# Patient Record
Sex: FEMALE | Race: OTHER | NOT HISPANIC OR LATINO
[De-identification: names, ages, dates, MRNs, and addresses within clinical notes are randomized per-mention and may not be internally consistent; named-entity substitution may affect disease eponyms.]

---

## 2018-08-26 PROBLEM — Z86.73 HISTORY OF CEREBROVASCULAR ACCIDENT: Status: RESOLVED | Noted: 2018-08-26 | Resolved: 2018-08-26

## 2018-08-26 PROBLEM — Z00.00 ENCOUNTER FOR PREVENTIVE HEALTH EXAMINATION: Status: ACTIVE | Noted: 2018-08-26

## 2018-08-26 PROBLEM — I10 HTN (HYPERTENSION): Status: ACTIVE | Noted: 2018-08-26

## 2018-08-26 PROBLEM — E78.5 HYPERLIPIDEMIA: Status: ACTIVE | Noted: 2018-08-26

## 2018-08-26 PROBLEM — Z82.3 FAMILY HISTORY OF CEREBROVASCULAR ACCIDENT (CVA): Status: ACTIVE | Noted: 2018-08-26

## 2018-08-26 PROBLEM — F41.9 ANXIETY: Status: ACTIVE | Noted: 2018-08-26

## 2018-08-26 PROBLEM — Z87.898 HISTORY OF SEIZURE: Status: RESOLVED | Noted: 2018-08-26 | Resolved: 2018-08-26

## 2018-08-28 ENCOUNTER — APPOINTMENT (OUTPATIENT)
Dept: HEMATOLOGY ONCOLOGY | Facility: CLINIC | Age: 63
End: 2018-08-28
Payer: MEDICAID

## 2018-08-28 VITALS
HEART RATE: 80 BPM | TEMPERATURE: 98.3 F | SYSTOLIC BLOOD PRESSURE: 121 MMHG | WEIGHT: 199 LBS | HEIGHT: 64 IN | DIASTOLIC BLOOD PRESSURE: 73 MMHG | BODY MASS INDEX: 33.97 KG/M2

## 2018-08-28 DIAGNOSIS — F41.9 ANXIETY DISORDER, UNSPECIFIED: ICD-10-CM

## 2018-08-28 DIAGNOSIS — I10 ESSENTIAL (PRIMARY) HYPERTENSION: ICD-10-CM

## 2018-08-28 DIAGNOSIS — Z82.3 FAMILY HISTORY OF STROKE: ICD-10-CM

## 2018-08-28 DIAGNOSIS — E78.5 HYPERLIPIDEMIA, UNSPECIFIED: ICD-10-CM

## 2018-08-28 DIAGNOSIS — Z86.73 PERSONAL HISTORY OF TRANSIENT ISCHEMIC ATTACK (TIA), AND CEREBRAL INFARCTION W/OUT RESIDUAL DEFICITS: ICD-10-CM

## 2018-08-28 DIAGNOSIS — Z63.4 DISAPPEARANCE AND DEATH OF FAMILY MEMBER: ICD-10-CM

## 2018-08-28 DIAGNOSIS — Z87.898 PERSONAL HISTORY OF OTHER SPECIFIED CONDITIONS: ICD-10-CM

## 2018-08-28 PROCEDURE — 99205 OFFICE O/P NEW HI 60 MIN: CPT

## 2018-08-28 RX ORDER — SERTRALINE HYDROCHLORIDE 50 MG/1
50 TABLET, FILM COATED ORAL
Refills: 0 | Status: ACTIVE | COMMUNITY

## 2018-08-28 SDOH — SOCIAL STABILITY - SOCIAL INSECURITY: DISSAPEARANCE AND DEATH OF FAMILY MEMBER: Z63.4

## 2018-08-29 PROBLEM — Z63.4 WIDOWED: Status: ACTIVE | Noted: 2018-08-29

## 2018-09-01 ENCOUNTER — FORM ENCOUNTER (OUTPATIENT)
Age: 63
End: 2018-09-01

## 2018-09-02 ENCOUNTER — OUTPATIENT (OUTPATIENT)
Dept: OUTPATIENT SERVICES | Facility: HOSPITAL | Age: 63
LOS: 1 days | End: 2018-09-02
Payer: MEDICAID

## 2018-09-02 ENCOUNTER — APPOINTMENT (OUTPATIENT)
Dept: NUCLEAR MEDICINE | Facility: CLINIC | Age: 63
End: 2018-09-02
Payer: MEDICAID

## 2018-09-02 DIAGNOSIS — C34.90 MALIGNANT NEOPLASM OF UNSPECIFIED PART OF UNSPECIFIED BRONCHUS OR LUNG: ICD-10-CM

## 2018-09-02 PROCEDURE — 78815 PET IMAGE W/CT SKULL-THIGH: CPT

## 2018-09-02 PROCEDURE — 78815 PET IMAGE W/CT SKULL-THIGH: CPT | Mod: 26,PI

## 2018-09-02 PROCEDURE — A9552: CPT

## 2018-09-04 ENCOUNTER — APPOINTMENT (OUTPATIENT)
Dept: NEUROSURGERY | Facility: CLINIC | Age: 63
End: 2018-09-04
Payer: MEDICAID

## 2018-09-04 ENCOUNTER — RESULT REVIEW (OUTPATIENT)
Age: 63
End: 2018-09-04

## 2018-09-04 ENCOUNTER — OUTPATIENT (OUTPATIENT)
Dept: OUTPATIENT SERVICES | Facility: HOSPITAL | Age: 63
LOS: 1 days | Discharge: HOME | End: 2018-09-04
Payer: MEDICAID

## 2018-09-04 VITALS
DIASTOLIC BLOOD PRESSURE: 82 MMHG | TEMPERATURE: 98.7 F | SYSTOLIC BLOOD PRESSURE: 126 MMHG | HEIGHT: 64 IN | BODY MASS INDEX: 33.97 KG/M2 | WEIGHT: 199 LBS | HEART RATE: 85 BPM | RESPIRATION RATE: 16 BRPM

## 2018-09-04 DIAGNOSIS — C79.31 SECONDARY MALIGNANT NEOPLASM OF BRAIN: ICD-10-CM

## 2018-09-04 DIAGNOSIS — C34.90 MALIGNANT NEOPLASM OF UNSPECIFIED PART OF UNSPECIFIED BRONCHUS OR LUNG: ICD-10-CM

## 2018-09-04 PROCEDURE — 99203 OFFICE O/P NEW LOW 30 MIN: CPT

## 2018-09-04 PROCEDURE — 88321 CONSLTJ&REPRT SLD PREP ELSWR: CPT

## 2018-09-05 LAB — SURGICAL PATHOLOGY FINAL REPORT - CH: SIGNIFICANT CHANGE UP

## 2018-09-06 ENCOUNTER — FORM ENCOUNTER (OUTPATIENT)
Age: 63
End: 2018-09-06

## 2018-09-07 ENCOUNTER — APPOINTMENT (OUTPATIENT)
Dept: ULTRASOUND IMAGING | Facility: CLINIC | Age: 63
End: 2018-09-07
Payer: MEDICAID

## 2018-09-07 ENCOUNTER — OUTPATIENT (OUTPATIENT)
Dept: OUTPATIENT SERVICES | Facility: HOSPITAL | Age: 63
LOS: 1 days | End: 2018-09-07
Payer: MEDICAID

## 2018-09-07 DIAGNOSIS — Z91.89 OTHER SPECIFIED PERSONAL RISK FACTORS, NOT ELSEWHERE CLASSIFIED: ICD-10-CM

## 2018-09-07 DIAGNOSIS — Z00.8 ENCOUNTER FOR OTHER GENERAL EXAMINATION: ICD-10-CM

## 2018-09-07 PROCEDURE — 93971 EXTREMITY STUDY: CPT

## 2018-09-07 PROCEDURE — 93971 EXTREMITY STUDY: CPT | Mod: 26,LT

## 2018-09-11 ENCOUNTER — FORM ENCOUNTER (OUTPATIENT)
Age: 63
End: 2018-09-11

## 2018-09-11 DIAGNOSIS — C34.90 MALIGNANT NEOPLASM OF UNSPECIFIED PART OF UNSPECIFIED BRONCHUS OR LUNG: ICD-10-CM

## 2018-09-11 DIAGNOSIS — C79.31 SECONDARY MALIGNANT NEOPLASM OF BRAIN: ICD-10-CM

## 2018-09-12 ENCOUNTER — OUTPATIENT (OUTPATIENT)
Dept: OUTPATIENT SERVICES | Facility: HOSPITAL | Age: 63
LOS: 1 days | End: 2018-09-12
Payer: MEDICAID

## 2018-09-12 ENCOUNTER — APPOINTMENT (OUTPATIENT)
Dept: ULTRASOUND IMAGING | Facility: CLINIC | Age: 63
End: 2018-09-12
Payer: MEDICAID

## 2018-09-12 ENCOUNTER — APPOINTMENT (OUTPATIENT)
Dept: HEMATOLOGY ONCOLOGY | Facility: CLINIC | Age: 63
End: 2018-09-12
Payer: MEDICAID

## 2018-09-12 ENCOUNTER — APPOINTMENT (OUTPATIENT)
Dept: NEUROSURGERY | Facility: CLINIC | Age: 63
End: 2018-09-12
Payer: MEDICAID

## 2018-09-12 VITALS
DIASTOLIC BLOOD PRESSURE: 80 MMHG | SYSTOLIC BLOOD PRESSURE: 134 MMHG | HEART RATE: 79 BPM | HEIGHT: 64 IN | TEMPERATURE: 98.7 F

## 2018-09-12 VITALS
SYSTOLIC BLOOD PRESSURE: 132 MMHG | HEART RATE: 85 BPM | WEIGHT: 199 LBS | TEMPERATURE: 98.3 F | DIASTOLIC BLOOD PRESSURE: 81 MMHG | HEIGHT: 64 IN | BODY MASS INDEX: 33.97 KG/M2 | RESPIRATION RATE: 17 BRPM

## 2018-09-12 DIAGNOSIS — Z98.890 OTHER SPECIFIED POSTPROCEDURAL STATES: ICD-10-CM

## 2018-09-12 DIAGNOSIS — Z00.8 ENCOUNTER FOR OTHER GENERAL EXAMINATION: ICD-10-CM

## 2018-09-12 PROCEDURE — 99215 OFFICE O/P EST HI 40 MIN: CPT | Mod: 25

## 2018-09-12 PROCEDURE — 36415 COLL VENOUS BLD VENIPUNCTURE: CPT

## 2018-09-12 PROCEDURE — 99214 OFFICE O/P EST MOD 30 MIN: CPT | Mod: 57

## 2018-09-12 PROCEDURE — 93970 EXTREMITY STUDY: CPT | Mod: 26

## 2018-09-12 PROCEDURE — 93970 EXTREMITY STUDY: CPT

## 2018-09-12 RX ORDER — DEXAMETHASONE 2 MG/1
2 TABLET ORAL
Refills: 0 | Status: DISCONTINUED | COMMUNITY
End: 2018-09-12

## 2018-09-12 RX ORDER — ISOSORBIDE MONONITRATE 30 MG
30 TABLET, EXTENDED RELEASE 24 HR ORAL
Refills: 0 | Status: DISCONTINUED | COMMUNITY
End: 2018-09-12

## 2018-09-12 RX ORDER — BISOPROLOL FUMARATE 5 MG/1
5 TABLET, FILM COATED ORAL
Refills: 0 | Status: DISCONTINUED | COMMUNITY
End: 2018-09-12

## 2018-09-12 RX ORDER — TORSEMIDE 20 MG/1
20 TABLET ORAL
Refills: 0 | Status: DISCONTINUED | COMMUNITY
End: 2018-09-12

## 2018-09-13 ENCOUNTER — LABORATORY RESULT (OUTPATIENT)
Age: 63
End: 2018-09-13

## 2018-09-14 ENCOUNTER — MEDICATION RENEWAL (OUTPATIENT)
Age: 63
End: 2018-09-14

## 2018-09-14 RX ORDER — LEVETIRACETAM 1000 MG/1
1000 TABLET, FILM COATED ORAL TWICE DAILY
Qty: 60 | Refills: 5 | Status: ACTIVE | COMMUNITY
Start: 2018-09-14 | End: 1900-01-01

## 2018-09-17 LAB
ALBUMIN SERPL ELPH-MCNC: 3.9 G/DL
ALP BLD-CCNC: 56 U/L
ALT SERPL-CCNC: 17 U/L
ANION GAP SERPL CALC-SCNC: 17 MMOL/L
AST SERPL-CCNC: 14 U/L
BILIRUB SERPL-MCNC: 0.5 MG/DL
BUN SERPL-MCNC: 23 MG/DL
CALCIUM SERPL-MCNC: 9.4 MG/DL
CHLORIDE SERPL-SCNC: 97 MMOL/L
CO2 SERPL-SCNC: 25 MMOL/L
CREAT SERPL-MCNC: 0.57 MG/DL
GLUCOSE SERPL-MCNC: 237 MG/DL
HCT VFR BLD CALC: 38.5 %
HGB BLD-MCNC: 13.1 G/DL
MCHC RBC-ENTMCNC: 28.2 PG
MCHC RBC-ENTMCNC: 33.9 GM/DL
MCV RBC AUTO: 83.2 FL
PLATELET # BLD AUTO: 82 K/UL
POTASSIUM SERPL-SCNC: 4 MMOL/L
PROT SERPL-MCNC: 6.4 G/DL
RBC # BLD: 4.63 M/UL
RBC # FLD: 12.7 %
SODIUM SERPL-SCNC: 139 MMOL/L
WBC # FLD AUTO: 7.7 K/UL

## 2018-09-18 PROBLEM — Z98.890 S/P CRANIOTOMY: Status: ACTIVE | Noted: 2018-09-04

## 2018-09-20 ENCOUNTER — FORM ENCOUNTER (OUTPATIENT)
Age: 63
End: 2018-09-20

## 2018-09-21 ENCOUNTER — APPOINTMENT (OUTPATIENT)
Dept: INFUSION THERAPY | Facility: CLINIC | Age: 63
End: 2018-09-21

## 2018-09-21 ENCOUNTER — APPOINTMENT (OUTPATIENT)
Dept: MRI IMAGING | Facility: CLINIC | Age: 63
End: 2018-09-21
Payer: MEDICAID

## 2018-09-21 ENCOUNTER — LABORATORY RESULT (OUTPATIENT)
Age: 63
End: 2018-09-21

## 2018-09-21 ENCOUNTER — OUTPATIENT (OUTPATIENT)
Dept: OUTPATIENT SERVICES | Facility: HOSPITAL | Age: 63
LOS: 1 days | End: 2018-09-21
Payer: MEDICAID

## 2018-09-21 ENCOUNTER — APPOINTMENT (OUTPATIENT)
Dept: HEMATOLOGY ONCOLOGY | Facility: CLINIC | Age: 63
End: 2018-09-21
Payer: MEDICAID

## 2018-09-21 VITALS
HEART RATE: 105 BPM | HEIGHT: 64 IN | BODY MASS INDEX: 33.87 KG/M2 | WEIGHT: 198.38 LBS | SYSTOLIC BLOOD PRESSURE: 125 MMHG | DIASTOLIC BLOOD PRESSURE: 81 MMHG | TEMPERATURE: 97.9 F

## 2018-09-21 DIAGNOSIS — Z00.8 ENCOUNTER FOR OTHER GENERAL EXAMINATION: ICD-10-CM

## 2018-09-21 LAB
HCT VFR BLD CALC: 31.3 %
HGB BLD-MCNC: 10.6 G/DL
MCHC RBC-ENTMCNC: 28.2 PG
MCHC RBC-ENTMCNC: 33.8 GM/DL
MCV RBC AUTO: 83.5 FL
PLATELET # BLD AUTO: 87 K/UL
RBC # BLD: 3.74 M/UL
RBC # FLD: 13.2 %
WBC # FLD AUTO: 6.8 K/UL

## 2018-09-21 PROCEDURE — 96372 THER/PROPH/DIAG INJ SC/IM: CPT

## 2018-09-21 PROCEDURE — 36415 COLL VENOUS BLD VENIPUNCTURE: CPT

## 2018-09-21 PROCEDURE — 99215 OFFICE O/P EST HI 40 MIN: CPT | Mod: 25

## 2018-09-21 PROCEDURE — 70553 MRI BRAIN STEM W/O & W/DYE: CPT | Mod: 26

## 2018-09-21 PROCEDURE — 85025 COMPLETE CBC W/AUTO DIFF WBC: CPT

## 2018-09-21 PROCEDURE — A9585: CPT

## 2018-09-21 PROCEDURE — 70553 MRI BRAIN STEM W/O & W/DYE: CPT

## 2018-09-21 RX ORDER — NYSTATIN 100000 [USP'U]/ML
100000 SUSPENSION ORAL 4 TIMES DAILY
Qty: 480 | Refills: 2 | Status: ACTIVE | COMMUNITY
Start: 2018-09-21 | End: 1900-01-01

## 2018-09-24 PROBLEM — Z87.891 FORMER SMOKER: Status: ACTIVE | Noted: 2018-09-24

## 2018-09-24 LAB
ANION GAP SERPL CALC-SCNC: 17 MMOL/L
BUN SERPL-MCNC: 20 MG/DL
CALCIUM SERPL-MCNC: 9 MG/DL
CHLORIDE SERPL-SCNC: 98 MMOL/L
CO2 SERPL-SCNC: 25 MMOL/L
CREAT SERPL-MCNC: 0.43 MG/DL
GLUCOSE SERPL-MCNC: 465 MG/DL
POTASSIUM SERPL-SCNC: 3.4 MMOL/L
SODIUM SERPL-SCNC: 140 MMOL/L

## 2018-09-26 PROBLEM — G72.0 STEROID MYOPATHY: Status: ACTIVE | Noted: 2018-09-26

## 2018-09-26 PROBLEM — E09.9 STEROID-INDUCED DIABETES: Status: ACTIVE | Noted: 2018-09-26

## 2018-09-26 PROBLEM — M79.669 CALF TENDERNESS: Status: RESOLVED | Noted: 2018-09-04 | Resolved: 2018-09-26

## 2018-09-26 RX ORDER — LEVETIRACETAM 100 MG/ML
100 SOLUTION ORAL
Refills: 0 | Status: DISCONTINUED | COMMUNITY
End: 2018-09-26

## 2018-09-26 RX ORDER — DEXAMETHASONE 6 MG/1
6 TABLET ORAL
Refills: 0 | Status: DISCONTINUED | COMMUNITY
Start: 2018-09-12 | End: 2018-09-26

## 2018-09-28 ENCOUNTER — APPOINTMENT (OUTPATIENT)
Dept: HEMATOLOGY ONCOLOGY | Facility: CLINIC | Age: 63
End: 2018-09-28

## 2018-09-28 DIAGNOSIS — M79.669 PAIN IN UNSPECIFIED LOWER LEG: ICD-10-CM

## 2018-09-28 DIAGNOSIS — G72.0 DRUG-INDUCED MYOPATHY: ICD-10-CM

## 2018-09-28 DIAGNOSIS — T38.0X5A DRUG OR CHEMICAL INDUCED DIABETES MELLITUS W/OUT COMPLICATIONS: ICD-10-CM

## 2018-09-28 DIAGNOSIS — Z87.891 PERSONAL HISTORY OF NICOTINE DEPENDENCE: ICD-10-CM

## 2018-09-28 DIAGNOSIS — T38.0X5A DRUG-INDUCED MYOPATHY: ICD-10-CM

## 2018-09-28 DIAGNOSIS — E09.9 DRUG OR CHEMICAL INDUCED DIABETES MELLITUS W/OUT COMPLICATIONS: ICD-10-CM

## 2018-10-05 ENCOUNTER — APPOINTMENT (OUTPATIENT)
Dept: NEUROSURGERY | Facility: AMBULATORY SURGERY CENTER | Age: 63
End: 2018-10-05
Payer: MEDICAID

## 2018-10-05 PROCEDURE — 61796 SRS CRANIAL LESION SIMPLE: CPT

## 2018-10-09 ENCOUNTER — APPOINTMENT (OUTPATIENT)
Dept: NEUROSURGERY | Facility: CLINIC | Age: 63
End: 2018-10-09

## 2018-10-12 ENCOUNTER — LABORATORY RESULT (OUTPATIENT)
Age: 63
End: 2018-10-12

## 2018-10-12 ENCOUNTER — APPOINTMENT (OUTPATIENT)
Dept: HEMATOLOGY ONCOLOGY | Facility: CLINIC | Age: 63
End: 2018-10-12
Payer: MEDICAID

## 2018-10-12 VITALS
DIASTOLIC BLOOD PRESSURE: 80 MMHG | WEIGHT: 185 LBS | TEMPERATURE: 98.4 F | HEIGHT: 64 IN | BODY MASS INDEX: 31.58 KG/M2 | HEART RATE: 108 BPM | SYSTOLIC BLOOD PRESSURE: 115 MMHG

## 2018-10-12 LAB
ALBUMIN SERPL ELPH-MCNC: 3.5 G/DL
ALP BLD-CCNC: 110 U/L
ALT SERPL-CCNC: 15 U/L
ANION GAP SERPL CALC-SCNC: 14 MMOL/L
AST SERPL-CCNC: 21 U/L
BILIRUB SERPL-MCNC: 0.3 MG/DL
BUN SERPL-MCNC: 18 MG/DL
CALCIUM SERPL-MCNC: 9 MG/DL
CHLORIDE SERPL-SCNC: 105 MMOL/L
CO2 SERPL-SCNC: 22 MMOL/L
CREAT SERPL-MCNC: 0.38 MG/DL
ERYTHROCYTE [SEDIMENTATION RATE] IN BLOOD BY WESTERGREN METHOD: 55 MM/HR
GLUCOSE SERPL-MCNC: 89 MG/DL
HAPTOGLOB SERPL-MCNC: 405 MG/DL
HCT VFR BLD CALC: 30.9 %
HGB BLD-MCNC: 9.74 G/DL
IRON SATN MFR SERPL: 19 %
IRON SERPL-MCNC: 53 UG/DL
MCHC RBC-ENTMCNC: 28.6 PG
MCHC RBC-ENTMCNC: 31.5 GM/DL
MCV RBC AUTO: 90.9 FL
PLATELET # BLD AUTO: 238 K/UL
POTASSIUM SERPL-SCNC: 4.1 MMOL/L
PROT SERPL-MCNC: 6.3 G/DL
RBC # BLD: 3.4 M/UL
RBC # FLD: 19.1 %
SODIUM SERPL-SCNC: 141 MMOL/L
TIBC SERPL-MCNC: 285 UG/DL
UIBC SERPL-MCNC: 232 UG/DL
WBC # FLD AUTO: 8.1 K/UL

## 2018-10-12 PROCEDURE — 99215 OFFICE O/P EST HI 40 MIN: CPT | Mod: 25

## 2018-10-12 PROCEDURE — 85025 COMPLETE CBC W/AUTO DIFF WBC: CPT

## 2018-10-12 PROCEDURE — 36415 COLL VENOUS BLD VENIPUNCTURE: CPT

## 2018-10-12 RX ORDER — DEXAMETHASONE 2 MG/1
2 TABLET ORAL EVERY 8 HOURS
Qty: 270 | Refills: 3 | Status: DISCONTINUED | COMMUNITY
Start: 2018-09-14 | End: 2018-10-12

## 2018-10-12 RX ORDER — CHOLECALCIFEROL (VITAMIN D3) 50 MCG
50 MCG TABLET ORAL
Refills: 0 | Status: ACTIVE | COMMUNITY

## 2018-10-12 RX ORDER — HYDROCHLOROTHIAZIDE 25 MG/1
25 TABLET ORAL DAILY
Qty: 30 | Refills: 0 | Status: DISCONTINUED | COMMUNITY
Start: 2018-09-21 | End: 2018-10-12

## 2018-10-12 RX ORDER — MULTIVITAMIN
CAPSULE ORAL
Refills: 0 | Status: ACTIVE | COMMUNITY

## 2018-10-12 RX ORDER — SENNOSIDES 8.6 MG/1
CAPSULE, GELATIN COATED ORAL
Refills: 0 | Status: ACTIVE | COMMUNITY

## 2018-10-12 RX ORDER — LACTOBACILLUS ACIDOPHILUS/PECT 30 MG-20MG
TABLET ORAL
Refills: 0 | Status: ACTIVE | COMMUNITY

## 2018-10-12 RX ORDER — AMLODIPINE BESYLATE 5 MG/1
5 TABLET ORAL
Refills: 0 | Status: DISCONTINUED | COMMUNITY
End: 2018-10-12

## 2018-10-12 RX ORDER — GENTAMICIN SULFATE 3 MG/ML
0.3 SOLUTION OPHTHALMIC EVERY 4 HOURS
Qty: 1 | Refills: 0 | Status: DISCONTINUED | COMMUNITY
Start: 2018-09-12 | End: 2018-10-12

## 2018-10-12 RX ORDER — ALENDRONATE SODIUM 70 MG/1
70 TABLET ORAL
Refills: 0 | Status: ACTIVE | COMMUNITY

## 2018-10-12 RX ORDER — INSULIN LISPRO 100 [IU]/ML
INJECTION, SOLUTION INTRAVENOUS; SUBCUTANEOUS
Refills: 0 | Status: ACTIVE | COMMUNITY

## 2018-10-12 RX ORDER — SIMVASTATIN 10 MG/1
10 TABLET, FILM COATED ORAL
Refills: 0 | Status: ACTIVE | COMMUNITY

## 2018-10-15 PROBLEM — Z86.69 HISTORY OF ACUTE CONJUNCTIVITIS: Status: RESOLVED | Noted: 2018-09-12 | Resolved: 2018-10-15

## 2018-10-15 LAB
FERRITIN SERPL-MCNC: 242 NG/ML
FOLATE SERPL-MCNC: 13.7 NG/ML
VIT B12 SERPL-MCNC: 647 PG/ML

## 2018-10-17 ENCOUNTER — APPOINTMENT (OUTPATIENT)
Dept: INFUSION THERAPY | Facility: CLINIC | Age: 63
End: 2018-10-17
Payer: MEDICAID

## 2018-10-17 ENCOUNTER — LABORATORY RESULT (OUTPATIENT)
Age: 63
End: 2018-10-17

## 2018-10-17 VITALS
HEIGHT: 64 IN | BODY MASS INDEX: 31.58 KG/M2 | HEART RATE: 98 BPM | TEMPERATURE: 97.8 F | WEIGHT: 185 LBS | DIASTOLIC BLOOD PRESSURE: 86 MMHG | SYSTOLIC BLOOD PRESSURE: 129 MMHG

## 2018-10-17 DIAGNOSIS — Z86.69 PERSONAL HISTORY OF OTHER DISEASES OF THE NERVOUS SYSTEM AND SENSE ORGANS: ICD-10-CM

## 2018-10-17 LAB
HCT VFR BLD CALC: 33.2 %
HGB BLD-MCNC: 10.5 G/DL
MCHC RBC-ENTMCNC: 28.9 PG
MCHC RBC-ENTMCNC: 31.6 GM/DL
MCV RBC AUTO: 91.5 FL
PLATELET # BLD AUTO: 230 K/UL
RBC # BLD: 3.63 M/UL
RBC # FLD: 19.4 %
WBC # FLD AUTO: 8.6 K/UL

## 2018-10-17 PROCEDURE — 36415 COLL VENOUS BLD VENIPUNCTURE: CPT

## 2018-10-17 PROCEDURE — 96413 CHEMO IV INFUSION 1 HR: CPT

## 2018-10-17 PROCEDURE — 96375 TX/PRO/DX INJ NEW DRUG ADDON: CPT

## 2018-10-17 PROCEDURE — 85025 COMPLETE CBC W/AUTO DIFF WBC: CPT

## 2018-10-18 LAB — CEA SERPL-MCNC: 1.7 NG/ML

## 2018-10-23 ENCOUNTER — APPOINTMENT (OUTPATIENT)
Dept: HEMATOLOGY ONCOLOGY | Facility: CLINIC | Age: 63
End: 2018-10-23
Payer: MEDICAID

## 2018-10-23 ENCOUNTER — LABORATORY RESULT (OUTPATIENT)
Age: 63
End: 2018-10-23

## 2018-10-23 VITALS — SYSTOLIC BLOOD PRESSURE: 112 MMHG | DIASTOLIC BLOOD PRESSURE: 73 MMHG | HEART RATE: 82 BPM | TEMPERATURE: 98.3 F

## 2018-10-23 DIAGNOSIS — Z86.2 PERSONAL HISTORY OF DISEASES OF THE BLOOD AND BLOOD-FORMING ORGANS AND CERTAIN DISORDERS INVOLVING THE IMMUNE MECHANISM: ICD-10-CM

## 2018-10-23 DIAGNOSIS — K86.89 OTHER SPECIFIED DISEASES OF PANCREAS: ICD-10-CM

## 2018-10-23 DIAGNOSIS — R60.0 LOCALIZED EDEMA: ICD-10-CM

## 2018-10-23 DIAGNOSIS — B37.0 CANDIDAL STOMATITIS: ICD-10-CM

## 2018-10-23 DIAGNOSIS — Z79.01 LONG TERM (CURRENT) USE OF ANTICOAGULANTS: ICD-10-CM

## 2018-10-23 LAB
HCT VFR BLD CALC: 28.4 %
HGB BLD-MCNC: 8.96 G/DL
MCHC RBC-ENTMCNC: 29 PG
MCHC RBC-ENTMCNC: 31.5 GM/DL
MCV RBC AUTO: 92 FL
PLATELET # BLD AUTO: 182 K/UL
RBC # BLD: 3.09 M/UL
RBC # FLD: 18.1 %
WBC # FLD AUTO: 4.4 K/UL

## 2018-10-23 PROCEDURE — 36415 COLL VENOUS BLD VENIPUNCTURE: CPT

## 2018-10-23 PROCEDURE — 85025 COMPLETE CBC W/AUTO DIFF WBC: CPT

## 2018-10-23 PROCEDURE — 99215 OFFICE O/P EST HI 40 MIN: CPT | Mod: 25

## 2018-10-24 ENCOUNTER — APPOINTMENT (OUTPATIENT)
Dept: OTOLARYNGOLOGY | Facility: CLINIC | Age: 63
End: 2018-10-24
Payer: MEDICAID

## 2018-10-24 ENCOUNTER — FORM ENCOUNTER (OUTPATIENT)
Age: 63
End: 2018-10-24

## 2018-10-24 VITALS
BODY MASS INDEX: 31.24 KG/M2 | HEIGHT: 64 IN | HEART RATE: 60 BPM | WEIGHT: 183 LBS | SYSTOLIC BLOOD PRESSURE: 107 MMHG | DIASTOLIC BLOOD PRESSURE: 70 MMHG

## 2018-10-24 DIAGNOSIS — Z87.448 PERSONAL HISTORY OF OTHER DISEASES OF URINARY SYSTEM: ICD-10-CM

## 2018-10-24 DIAGNOSIS — Z86.39 PERSONAL HISTORY OF OTHER ENDOCRINE, NUTRITIONAL AND METABOLIC DISEASE: ICD-10-CM

## 2018-10-24 LAB
ALBUMIN SERPL ELPH-MCNC: 3.4 G/DL
ALP BLD-CCNC: 78 U/L
ALT SERPL-CCNC: 35 U/L
ANION GAP SERPL CALC-SCNC: 12 MMOL/L
AST SERPL-CCNC: 33 U/L
BILIRUB SERPL-MCNC: 0.2 MG/DL
BUN SERPL-MCNC: 13 MG/DL
CALCIUM SERPL-MCNC: 8.6 MG/DL
CHLORIDE SERPL-SCNC: 108 MMOL/L
CO2 SERPL-SCNC: 24 MMOL/L
CREAT SERPL-MCNC: 0.44 MG/DL
GLUCOSE SERPL-MCNC: 165 MG/DL
POTASSIUM SERPL-SCNC: 3.9 MMOL/L
PROT SERPL-MCNC: 6.2 G/DL
SODIUM SERPL-SCNC: 144 MMOL/L

## 2018-10-24 PROCEDURE — 30903 CONTROL OF NOSEBLEED: CPT | Mod: 50

## 2018-10-24 PROCEDURE — 99204 OFFICE O/P NEW MOD 45 MIN: CPT | Mod: 25

## 2018-10-24 RX ORDER — BISOPROLOL FUMARATE 5 MG/1
5 TABLET, FILM COATED ORAL
Refills: 0 | Status: ACTIVE | COMMUNITY

## 2018-10-24 RX ORDER — LEVETIRACETAM 1000 MG/1
1000 TABLET, FILM COATED ORAL
Refills: 0 | Status: ACTIVE | COMMUNITY

## 2018-10-25 ENCOUNTER — OUTPATIENT (OUTPATIENT)
Dept: OUTPATIENT SERVICES | Facility: HOSPITAL | Age: 63
LOS: 1 days | End: 2018-10-25
Payer: MEDICAID

## 2018-10-25 ENCOUNTER — APPOINTMENT (OUTPATIENT)
Dept: CT IMAGING | Facility: CLINIC | Age: 63
End: 2018-10-25
Payer: MEDICAID

## 2018-10-25 DIAGNOSIS — Z00.8 ENCOUNTER FOR OTHER GENERAL EXAMINATION: ICD-10-CM

## 2018-10-25 PROCEDURE — 70450 CT HEAD/BRAIN W/O DYE: CPT | Mod: 26

## 2018-10-25 PROCEDURE — 70450 CT HEAD/BRAIN W/O DYE: CPT

## 2018-10-30 ENCOUNTER — APPOINTMENT (OUTPATIENT)
Dept: HEMATOLOGY ONCOLOGY | Facility: CLINIC | Age: 63
End: 2018-10-30
Payer: MEDICAID

## 2018-10-30 ENCOUNTER — LABORATORY RESULT (OUTPATIENT)
Age: 63
End: 2018-10-30

## 2018-10-30 VITALS
DIASTOLIC BLOOD PRESSURE: 82 MMHG | TEMPERATURE: 97.9 F | SYSTOLIC BLOOD PRESSURE: 128 MMHG | HEART RATE: 59 BPM | WEIGHT: 197 LBS | BODY MASS INDEX: 33.63 KG/M2 | HEIGHT: 64 IN

## 2018-10-30 DIAGNOSIS — R51 HEADACHE: ICD-10-CM

## 2018-10-30 DIAGNOSIS — D64.9 ANEMIA, UNSPECIFIED: ICD-10-CM

## 2018-10-30 DIAGNOSIS — I82.402 ACUTE EMBOLISM AND THROMBOSIS OF UNSPECIFIED DEEP VEINS OF LEFT LOWER EXTREMITY: ICD-10-CM

## 2018-10-30 DIAGNOSIS — K59.00 CONSTIPATION, UNSPECIFIED: ICD-10-CM

## 2018-10-30 LAB
HCT VFR BLD CALC: 31.4 %
HGB BLD-MCNC: 9.95 G/DL
MCHC RBC-ENTMCNC: 29.6 PG
MCHC RBC-ENTMCNC: 31.7 GM/DL
MCV RBC AUTO: 93.3 FL
PLATELET # BLD AUTO: 176 K/UL
RBC # BLD: 3.36 M/UL
RBC # FLD: 20.5 %
WBC # FLD AUTO: 7.8 K/UL

## 2018-10-30 PROCEDURE — 36415 COLL VENOUS BLD VENIPUNCTURE: CPT

## 2018-10-30 PROCEDURE — 85025 COMPLETE CBC W/AUTO DIFF WBC: CPT

## 2018-10-30 PROCEDURE — 99214 OFFICE O/P EST MOD 30 MIN: CPT | Mod: 25

## 2018-10-30 RX ORDER — DOCUSATE SODIUM 100 MG/1
100 CAPSULE ORAL
Qty: 60 | Refills: 3 | Status: ACTIVE | COMMUNITY
Start: 2018-10-30 | End: 1900-01-01

## 2018-10-31 ENCOUNTER — APPOINTMENT (OUTPATIENT)
Dept: OTOLARYNGOLOGY | Facility: CLINIC | Age: 63
End: 2018-10-31
Payer: MEDICAID

## 2018-10-31 VITALS — HEIGHT: 64 IN | BODY MASS INDEX: 33.63 KG/M2 | WEIGHT: 197 LBS

## 2018-10-31 DIAGNOSIS — R04.0 EPISTAXIS: ICD-10-CM

## 2018-10-31 PROCEDURE — 99213 OFFICE O/P EST LOW 20 MIN: CPT

## 2018-11-02 PROBLEM — I82.402 LEFT LEG DVT: Status: RESOLVED | Noted: 2018-09-12 | Resolved: 2018-11-02

## 2018-11-09 ENCOUNTER — LABORATORY RESULT (OUTPATIENT)
Age: 63
End: 2018-11-09

## 2018-11-09 ENCOUNTER — APPOINTMENT (OUTPATIENT)
Dept: INFUSION THERAPY | Facility: CLINIC | Age: 63
End: 2018-11-09
Payer: MEDICAID

## 2018-11-09 VITALS
BODY MASS INDEX: 35.51 KG/M2 | DIASTOLIC BLOOD PRESSURE: 72 MMHG | SYSTOLIC BLOOD PRESSURE: 127 MMHG | HEIGHT: 64 IN | WEIGHT: 208 LBS | HEART RATE: 65 BPM | TEMPERATURE: 98.2 F

## 2018-11-09 LAB
HCT VFR BLD CALC: 32.5 %
HGB BLD-MCNC: 10.6 G/DL
MCHC RBC-ENTMCNC: 30.2 PG
MCHC RBC-ENTMCNC: 32.5 GM/DL
MCV RBC AUTO: 93 FL
PLATELET # BLD AUTO: 282 K/UL
RBC # BLD: 3.5 M/UL
RBC # FLD: 18.9 %
WBC # FLD AUTO: 7.2 K/UL

## 2018-11-09 PROCEDURE — 96367 TX/PROPH/DG ADDL SEQ IV INF: CPT

## 2018-11-09 PROCEDURE — 85025 COMPLETE CBC W/AUTO DIFF WBC: CPT

## 2018-11-09 PROCEDURE — 96413 CHEMO IV INFUSION 1 HR: CPT

## 2018-11-09 PROCEDURE — 96375 TX/PRO/DX INJ NEW DRUG ADDON: CPT

## 2018-11-09 PROCEDURE — 36415 COLL VENOUS BLD VENIPUNCTURE: CPT

## 2018-11-09 RX ORDER — DOCUSATE SODIUM 100 MG/1
100 CAPSULE ORAL
Qty: 60 | Refills: 3 | Status: DISCONTINUED | COMMUNITY
End: 2018-11-09

## 2018-11-09 RX ORDER — PROCHLORPERAZINE MALEATE 10 MG/1
10 TABLET ORAL EVERY 6 HOURS
Qty: 30 | Refills: 3 | Status: ACTIVE | COMMUNITY
Start: 2018-11-09 | End: 1900-01-01

## 2018-11-15 ENCOUNTER — FORM ENCOUNTER (OUTPATIENT)
Age: 63
End: 2018-11-15

## 2018-11-15 ENCOUNTER — RX RENEWAL (OUTPATIENT)
Age: 63
End: 2018-11-15

## 2018-11-16 ENCOUNTER — OUTPATIENT (OUTPATIENT)
Dept: OUTPATIENT SERVICES | Facility: HOSPITAL | Age: 63
LOS: 1 days | End: 2018-11-16
Payer: MEDICAID

## 2018-11-16 ENCOUNTER — APPOINTMENT (OUTPATIENT)
Dept: MRI IMAGING | Facility: CLINIC | Age: 63
End: 2018-11-16
Payer: MEDICAID

## 2018-11-16 DIAGNOSIS — Z00.8 ENCOUNTER FOR OTHER GENERAL EXAMINATION: ICD-10-CM

## 2018-11-16 PROCEDURE — 70553 MRI BRAIN STEM W/O & W/DYE: CPT

## 2018-11-16 PROCEDURE — 82565 ASSAY OF CREATININE: CPT

## 2018-11-16 PROCEDURE — 70553 MRI BRAIN STEM W/O & W/DYE: CPT | Mod: 26

## 2018-11-16 RX ORDER — ASCORBIC ACID 500 MG
500 TABLET ORAL
Refills: 0 | Status: DISCONTINUED | COMMUNITY
End: 2018-11-16

## 2018-11-16 RX ORDER — AMOXICILLIN AND CLAVULANATE POTASSIUM 875; 125 MG/1; MG/1
875-125 TABLET, COATED ORAL
Qty: 28 | Refills: 0 | Status: DISCONTINUED | COMMUNITY
Start: 2018-10-23 | End: 2018-11-16

## 2018-11-16 RX ORDER — MULTIVIT-MIN/IRON/FOLIC ACID/K 18-600-40
500 CAPSULE ORAL DAILY
Qty: 90 | Refills: 3 | Status: ACTIVE | COMMUNITY
Start: 2018-11-16 | End: 1900-01-01

## 2018-11-16 RX ORDER — ENOXAPARIN SODIUM 100 MG/ML
80 INJECTION SUBCUTANEOUS
Qty: 60 | Refills: 11 | Status: ACTIVE | COMMUNITY
Start: 2018-09-12 | End: 1900-01-01

## 2018-11-21 ENCOUNTER — APPOINTMENT (OUTPATIENT)
Dept: HEMATOLOGY ONCOLOGY | Facility: CLINIC | Age: 63
End: 2018-11-21
Payer: MEDICAID

## 2018-11-21 ENCOUNTER — LABORATORY RESULT (OUTPATIENT)
Age: 63
End: 2018-11-21

## 2018-11-21 VITALS
WEIGHT: 208 LBS | DIASTOLIC BLOOD PRESSURE: 76 MMHG | BODY MASS INDEX: 35.51 KG/M2 | HEART RATE: 67 BPM | SYSTOLIC BLOOD PRESSURE: 132 MMHG | TEMPERATURE: 98.5 F | HEIGHT: 64 IN

## 2018-11-21 DIAGNOSIS — M81.0 AGE-RELATED OSTEOPOROSIS W/OUT CURRENT PATHOLOGICAL FRACTURE: ICD-10-CM

## 2018-11-21 LAB
HCT VFR BLD CALC: 33.2 %
HGB BLD-MCNC: 10.5 G/DL
MCHC RBC-ENTMCNC: 30.5 PG
MCHC RBC-ENTMCNC: 31.6 GM/DL
MCV RBC AUTO: 96.7 FL
PLATELET # BLD AUTO: 134 K/UL
RBC # BLD: 3.44 M/UL
RBC # FLD: 18.6 %
WBC # FLD AUTO: 6.6 K/UL

## 2018-11-21 PROCEDURE — 36415 COLL VENOUS BLD VENIPUNCTURE: CPT

## 2018-11-21 PROCEDURE — 85025 COMPLETE CBC W/AUTO DIFF WBC: CPT

## 2018-11-21 PROCEDURE — 99215 OFFICE O/P EST HI 40 MIN: CPT | Mod: 25

## 2018-11-21 RX ORDER — ALENDRONATE SODIUM 70 MG/1
70 TABLET ORAL
Qty: 12 | Refills: 3 | Status: ACTIVE | COMMUNITY
Start: 2018-11-21 | End: 1900-01-01

## 2018-11-23 LAB
ALBUMIN SERPL ELPH-MCNC: 4 G/DL
ALP BLD-CCNC: 69 U/L
ALT SERPL-CCNC: 24 U/L
ANION GAP SERPL CALC-SCNC: 15 MMOL/L
AST SERPL-CCNC: 28 U/L
BILIRUB SERPL-MCNC: <0.2 MG/DL
BUN SERPL-MCNC: 19 MG/DL
CALCIUM SERPL-MCNC: 9.2 MG/DL
CHLORIDE SERPL-SCNC: 108 MMOL/L
CO2 SERPL-SCNC: 22 MMOL/L
CREAT SERPL-MCNC: 0.62 MG/DL
GLUCOSE SERPL-MCNC: 128 MG/DL
POTASSIUM SERPL-SCNC: 4.6 MMOL/L
PROT SERPL-MCNC: 6.6 G/DL
SODIUM SERPL-SCNC: 145 MMOL/L

## 2018-11-28 ENCOUNTER — APPOINTMENT (OUTPATIENT)
Dept: CT IMAGING | Facility: CLINIC | Age: 63
End: 2018-11-28

## 2018-11-28 ENCOUNTER — FORM ENCOUNTER (OUTPATIENT)
Age: 63
End: 2018-11-28

## 2018-11-29 ENCOUNTER — OUTPATIENT (OUTPATIENT)
Dept: OUTPATIENT SERVICES | Facility: HOSPITAL | Age: 63
LOS: 1 days | End: 2018-11-29
Payer: MEDICAID

## 2018-11-29 ENCOUNTER — APPOINTMENT (OUTPATIENT)
Dept: CT IMAGING | Facility: CLINIC | Age: 63
End: 2018-11-29
Payer: MEDICAID

## 2018-11-29 DIAGNOSIS — Z00.8 ENCOUNTER FOR OTHER GENERAL EXAMINATION: ICD-10-CM

## 2018-11-29 PROCEDURE — 74177 CT ABD & PELVIS W/CONTRAST: CPT | Mod: 26

## 2018-11-29 PROCEDURE — 74177 CT ABD & PELVIS W/CONTRAST: CPT

## 2018-11-29 PROCEDURE — 71260 CT THORAX DX C+: CPT | Mod: 26

## 2018-11-29 PROCEDURE — 71260 CT THORAX DX C+: CPT

## 2018-11-30 NOTE — HISTORY OF PRESENT ILLNESS
[Disease: _____________________] : Disease: [unfilled] [M: ___] : M[unfilled] [AJCC Stage: ____] : AJCC Stage: [unfilled] [de-identified] : 61 y/o [place] female presented with new onset seizures when travelling with family from [place] back to [place] ( left facial contraction and left arm contraction for few seconds, bite her tongue and had urinary incontinence  followed by 20 min of postictal confusion.  Admitted to [facility]. CT  brain/ MRI showed 1.8 cm irregular intraparenchymal ring enhancing lesion in right posterior occipital region with significant surrounding edema with mass effect \par Started on steroids and Keppra. \par \par CT CAP with contrast : 10 mm RUL pulmonary nodule; 1.7 cm subcarinal node and 1.9 cm right hilar node. 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 \par \par 8/20/18 resection of right posterior temporal/ occipital  tumor - metastatic poorly diff carcinoma. Path reviewed here- favor adeno, IHC not definitive to point origin- differential broad.\par Case presented on multidisciplinary tumor board.  Clinical presentation pathology and PET scan  c/w metastatic lung cancer- it was felt that lung or node biopsy will not add any additional information- not necessary and carries risk of procedure related complications.\par \par \par S/p Cyberknife Rt to brain met.\par \par On chemotherapy- Pemetrexed  cycle # 2 on Nov 9.  No carboplatin- due to patient's frail condition. Patient wanted to hold off with immunotherapy. \par \par Had more visual problems/  balance/ weakness  recently. MRI of brain Nov 16- multiple new brain mets with edema.\par Dexamethasone increased  to 4 mg BID. \par  [de-identified] : favor adenocarcinoma [de-identified] : [facility] - negative for EGFR, ROS, ALK, ROS 1, BRAF, MET, ERPB2, KRAS\par \par PDL1  1 % \par \par TMB low \par MSI stable  [de-identified] : On dexamethasone  4 mg bid for few days now. No major change - vision changes/ weakness no change.

## 2018-11-30 NOTE — REVIEW OF SYSTEMS
[Patient Intake Form Reviewed] : Patient intake form was reviewed [Fatigue] : fatigue [Lower Ext Edema] : lower extremity edema [Muscle Weakness] : muscle weakness [Insomnia] : insomnia [Anxiety] : anxiety [Negative] : Integumentary [FreeTextEntry3] : per HPI  [FreeTextEntry7] : bloating  [de-identified] : per HPI

## 2018-11-30 NOTE — PHYSICAL EXAM
[Ambulatory and capable of all self care but unable to carry out any work activities] : Status 2- Ambulatory and capable of all self care but unable to carry out any work activities. Up and about more than 50% of waking hours [Obese] : obese [Normal] : affect appropriate [de-identified] : no thrush ., swelling of left parotid area improved  [de-identified] : bilat LE edema  [de-identified] : proximal muscle weakness [de-identified] : healed craniotomy incision  [de-identified] : 4/5 strenght both LE and slight LUE weakness

## 2018-11-30 NOTE — ASSESSMENT
[FreeTextEntry1] : 61 y/o female with adenocarcinoma of lung and progression of brain metastases.\par S/p excision of solitary metastatic brain lesion/ Cyberknife RT\par S/p two cycle of Pemetrexed.\par Progression of brain mets.\par Discussed with patient and her daughter. Explained poor prognosis. Initially- at the time of diagnosis patient was very reluctant to do any treatments/ very reluctant to chemotherapy. Now she wants to do " everything". \par \par Plan : spoke with Dr Henley- Rad Oncology- plan for RT ( probably WBRT) next week\par Continue current dose of steroids and Keppra.\par CT scan of chest- r/o systemic progression.\par If scan stable- after WBRT  she will continue pemetrexed and add Keytruda.\par Return visit in 2-3 weeks ( after brain RT completed ) for chemotherapy/ and " chemotherapy" talk re immunotherapy.

## 2018-11-30 NOTE — REASON FOR VISIT
[Follow-Up Visit] : a follow-up [Family Member] : family member [FreeTextEntry2] : lung cancer with brain mets

## 2018-12-05 ENCOUNTER — APPOINTMENT (OUTPATIENT)
Dept: INFUSION THERAPY | Facility: CLINIC | Age: 63
End: 2018-12-05
Payer: MEDICAID

## 2018-12-05 ENCOUNTER — LABORATORY RESULT (OUTPATIENT)
Age: 63
End: 2018-12-05

## 2018-12-05 ENCOUNTER — OTHER (OUTPATIENT)
Age: 63
End: 2018-12-05

## 2018-12-05 VITALS
BODY MASS INDEX: 35.68 KG/M2 | DIASTOLIC BLOOD PRESSURE: 72 MMHG | HEART RATE: 56 BPM | SYSTOLIC BLOOD PRESSURE: 124 MMHG | WEIGHT: 209 LBS | TEMPERATURE: 98.4 F | HEIGHT: 64 IN

## 2018-12-05 LAB
HCT VFR BLD CALC: 34.3 %
HGB BLD-MCNC: 10.6 G/DL
MCHC RBC-ENTMCNC: 29.8 PG
MCHC RBC-ENTMCNC: 30.9 GM/DL
MCV RBC AUTO: 96.3 FL
PLATELET # BLD AUTO: 207 K/UL
RBC # BLD: 3.56 M/UL
RBC # FLD: 17.3 %
WBC # FLD AUTO: 9.5 K/UL

## 2018-12-05 PROCEDURE — 96413 CHEMO IV INFUSION 1 HR: CPT | Mod: Q5

## 2018-12-05 PROCEDURE — 85025 COMPLETE CBC W/AUTO DIFF WBC: CPT | Mod: Q5

## 2018-12-05 PROCEDURE — 36415 COLL VENOUS BLD VENIPUNCTURE: CPT | Mod: Q5

## 2018-12-06 LAB
ALBUMIN SERPL ELPH-MCNC: 3.9 G/DL
ALP BLD-CCNC: 68 U/L
ALT SERPL-CCNC: 17 U/L
ANION GAP SERPL CALC-SCNC: 15 MMOL/L
AST SERPL-CCNC: 23 U/L
BILIRUB SERPL-MCNC: <0.2 MG/DL
BUN SERPL-MCNC: 24 MG/DL
CALCIUM SERPL-MCNC: 9.1 MG/DL
CHLORIDE SERPL-SCNC: 107 MMOL/L
CO2 SERPL-SCNC: 23 MMOL/L
CREAT SERPL-MCNC: 0.44 MG/DL
GLUCOSE SERPL-MCNC: 97 MG/DL
POTASSIUM SERPL-SCNC: 4.4 MMOL/L
PROT SERPL-MCNC: 6.3 G/DL
SODIUM SERPL-SCNC: 145 MMOL/L
TSH SERPL-ACNC: 0.24 UIU/ML

## 2018-12-12 ENCOUNTER — APPOINTMENT (OUTPATIENT)
Dept: HEMATOLOGY ONCOLOGY | Facility: CLINIC | Age: 63
End: 2018-12-12
Payer: MEDICAID

## 2018-12-12 ENCOUNTER — LABORATORY RESULT (OUTPATIENT)
Age: 63
End: 2018-12-12

## 2018-12-12 VITALS — HEART RATE: 61 BPM | DIASTOLIC BLOOD PRESSURE: 74 MMHG | TEMPERATURE: 98 F | SYSTOLIC BLOOD PRESSURE: 122 MMHG

## 2018-12-12 LAB
HCT VFR BLD CALC: 36.2 %
HGB BLD-MCNC: 11.2 G/DL
MCHC RBC-ENTMCNC: 29.1 PG
MCHC RBC-ENTMCNC: 30.9 GM/DL
MCV RBC AUTO: 94.3 FL
PLATELET # BLD AUTO: 124 K/UL
RBC # BLD: 3.84 M/UL
RBC # FLD: 16.6 %
WBC # FLD AUTO: 9.5 K/UL

## 2018-12-12 PROCEDURE — 36415 COLL VENOUS BLD VENIPUNCTURE: CPT

## 2018-12-12 PROCEDURE — 85025 COMPLETE CBC W/AUTO DIFF WBC: CPT

## 2018-12-13 LAB
ALBUMIN SERPL ELPH-MCNC: 4 G/DL
ALP BLD-CCNC: 89 U/L
ALT SERPL-CCNC: 26 U/L
ANION GAP SERPL CALC-SCNC: 15 MMOL/L
AST SERPL-CCNC: 31 U/L
BILIRUB SERPL-MCNC: <0.2 MG/DL
BUN SERPL-MCNC: 29 MG/DL
CALCIUM SERPL-MCNC: 9.1 MG/DL
CHLORIDE SERPL-SCNC: 106 MMOL/L
CO2 SERPL-SCNC: 23 MMOL/L
CREAT SERPL-MCNC: 0.56 MG/DL
GLUCOSE SERPL-MCNC: 98 MG/DL
POTASSIUM SERPL-SCNC: 4.3 MMOL/L
PROT SERPL-MCNC: 6.8 G/DL
SODIUM SERPL-SCNC: 144 MMOL/L

## 2018-12-14 LAB — MAGNESIUM SERPL-MCNC: 2.1 MG/DL

## 2018-12-21 ENCOUNTER — LABORATORY RESULT (OUTPATIENT)
Age: 63
End: 2018-12-21

## 2018-12-21 ENCOUNTER — APPOINTMENT (OUTPATIENT)
Dept: HEMATOLOGY ONCOLOGY | Facility: CLINIC | Age: 63
End: 2018-12-21
Payer: MEDICAID

## 2018-12-21 VITALS — DIASTOLIC BLOOD PRESSURE: 77 MMHG | TEMPERATURE: 98.1 F | HEART RATE: 78 BPM | SYSTOLIC BLOOD PRESSURE: 121 MMHG

## 2018-12-21 DIAGNOSIS — C34.90 MALIGNANT NEOPLASM OF UNSPECIFIED PART OF UNSPECIFIED BRONCHUS OR LUNG: ICD-10-CM

## 2018-12-21 DIAGNOSIS — Z51.12 ENCOUNTER FOR ANTINEOPLASTIC IMMUNOTHERAPY: ICD-10-CM

## 2018-12-21 DIAGNOSIS — Z87.19 PERSONAL HISTORY OF OTHER DISEASES OF THE DIGESTIVE SYSTEM: ICD-10-CM

## 2018-12-21 DIAGNOSIS — R21 RASH AND OTHER NONSPECIFIC SKIN ERUPTION: ICD-10-CM

## 2018-12-21 DIAGNOSIS — Z87.09 PERSONAL HISTORY OF OTHER DISEASES OF THE RESPIRATORY SYSTEM: ICD-10-CM

## 2018-12-21 DIAGNOSIS — Z92.3 PERSONAL HISTORY OF IRRADIATION: ICD-10-CM

## 2018-12-21 DIAGNOSIS — Z29.8 ENCOUNTER FOR OTHER SPECIFIED PROPHYLACTIC MEASURES: ICD-10-CM

## 2018-12-21 DIAGNOSIS — R05 COUGH: ICD-10-CM

## 2018-12-21 DIAGNOSIS — Z51.11 ENCOUNTER FOR ANTINEOPLASTIC CHEMOTHERAPY: ICD-10-CM

## 2018-12-21 DIAGNOSIS — Z51.5 ENCOUNTER FOR ANTINEOPLASTIC CHEMOTHERAPY: ICD-10-CM

## 2018-12-21 LAB
ALBUMIN SERPL ELPH-MCNC: 3.8 G/DL
ALP BLD-CCNC: 120 U/L
ALT SERPL-CCNC: 34 U/L
ANION GAP SERPL CALC-SCNC: 12 MMOL/L
AST SERPL-CCNC: 42 U/L
BILIRUB SERPL-MCNC: <0.2 MG/DL
BUN SERPL-MCNC: 14 MG/DL
CALCIUM SERPL-MCNC: 8.9 MG/DL
CHLORIDE SERPL-SCNC: 109 MMOL/L
CO2 SERPL-SCNC: 22 MMOL/L
CREAT SERPL-MCNC: 0.51 MG/DL
GLUCOSE SERPL-MCNC: 182 MG/DL
HCT VFR BLD CALC: 36.5 %
HGB BLD-MCNC: 11.1 G/DL
MCHC RBC-ENTMCNC: 28.6 PG
MCHC RBC-ENTMCNC: 30.4 GM/DL
MCV RBC AUTO: 94.1 FL
PLATELET # BLD AUTO: 166 K/UL
POTASSIUM SERPL-SCNC: 3.9 MMOL/L
PROT SERPL-MCNC: 6.6 G/DL
RBC # BLD: 3.88 M/UL
RBC # FLD: 16.1 %
SODIUM SERPL-SCNC: 143 MMOL/L
WBC # FLD AUTO: 8.5 K/UL

## 2018-12-21 PROCEDURE — 99214 OFFICE O/P EST MOD 30 MIN: CPT | Mod: 25

## 2018-12-21 PROCEDURE — 85025 COMPLETE CBC W/AUTO DIFF WBC: CPT

## 2018-12-21 PROCEDURE — 36415 COLL VENOUS BLD VENIPUNCTURE: CPT

## 2018-12-21 RX ORDER — NYSTATIN 100000 [USP'U]/G
100000 CREAM TOPICAL TWICE DAILY
Qty: 1 | Refills: 0 | Status: DISCONTINUED | COMMUNITY
Start: 2018-10-23 | End: 2018-12-21

## 2018-12-21 RX ORDER — CLOTRIMAZOLE AND BETAMETHASONE DIPROPIONATE 10; .5 MG/G; MG/G
1-0.05 CREAM TOPICAL TWICE DAILY
Qty: 1 | Refills: 0 | Status: DISCONTINUED | COMMUNITY
Start: 2018-11-21 | End: 2018-12-21

## 2018-12-21 RX ORDER — ONDANSETRON 8 MG/1
8 TABLET, ORALLY DISINTEGRATING ORAL
Qty: 30 | Refills: 3 | Status: ACTIVE | COMMUNITY
Start: 2018-12-21 | End: 1900-01-01

## 2018-12-22 LAB — TSH SERPL-ACNC: 0.51 UIU/ML

## 2018-12-22 RX ORDER — HYDROCODONE BITARTRATE AND HOMATROPINE METHYLBROMIDE 5; 1.5 MG/5ML; MG/5ML
5-1.5 SYRUP ORAL
Qty: 480 | Refills: 0 | Status: ACTIVE | COMMUNITY
Start: 2018-12-22 | End: 1900-01-01

## 2018-12-26 ENCOUNTER — APPOINTMENT (OUTPATIENT)
Dept: INFUSION THERAPY | Facility: CLINIC | Age: 63
End: 2018-12-26

## 2018-12-26 VITALS — TEMPERATURE: 97.7 F | DIASTOLIC BLOOD PRESSURE: 74 MMHG | HEART RATE: 73 BPM | SYSTOLIC BLOOD PRESSURE: 111 MMHG

## 2018-12-30 PROBLEM — C34.90 LUNG CANCER: Status: ACTIVE | Noted: 2018-08-26

## 2018-12-30 PROBLEM — Z51.12 ENCOUNTER FOR ANTINEOPLASTIC IMMUNOTHERAPY: Status: ACTIVE | Noted: 2018-12-30

## 2018-12-30 PROBLEM — R21 RASH OF GROIN: Status: RESOLVED | Noted: 2018-10-23 | Resolved: 2018-12-30

## 2018-12-30 PROBLEM — Z87.19 HISTORY OF PAROTITIS: Status: RESOLVED | Noted: 2018-10-24 | Resolved: 2018-12-30

## 2018-12-30 PROBLEM — Z51.11 ON PALLIATIVE ANTINEOPLASTIC CHEMOTHERAPY: Status: RESOLVED | Noted: 2018-10-12 | Resolved: 2018-12-30

## 2018-12-30 PROBLEM — Z29.8 ENCOUNTER FOR VITAMIN B12 INJECTION WHILE ON ALIMTA CHEMOTHERAPY: Status: RESOLVED | Noted: 2018-10-12 | Resolved: 2018-12-30

## 2018-12-30 PROBLEM — R05 COUGH: Status: ACTIVE | Noted: 2018-12-22

## 2019-01-03 ENCOUNTER — LABORATORY RESULT (OUTPATIENT)
Age: 64
End: 2019-01-03

## 2019-01-03 ENCOUNTER — APPOINTMENT (OUTPATIENT)
Dept: INFUSION THERAPY | Facility: CLINIC | Age: 64
End: 2019-01-03
Payer: MEDICAID

## 2019-01-03 VITALS — HEART RATE: 79 BPM | SYSTOLIC BLOOD PRESSURE: 103 MMHG | DIASTOLIC BLOOD PRESSURE: 70 MMHG | TEMPERATURE: 97.5 F

## 2019-01-03 DIAGNOSIS — R11.2 NAUSEA WITH VOMITING, UNSPECIFIED: ICD-10-CM

## 2019-01-03 LAB
HCT VFR BLD CALC: 32 %
HGB BLD-MCNC: 9.58 G/DL
MCHC RBC-ENTMCNC: 27.6 PG
MCHC RBC-ENTMCNC: 30 GM/DL
MCV RBC AUTO: 91.9 FL
PLATELET # BLD AUTO: 133 K/UL
RBC # BLD: 3.48 M/UL
RBC # FLD: 16.5 %
WBC # FLD AUTO: 9.4 K/UL

## 2019-01-03 PROCEDURE — 85025 COMPLETE CBC W/AUTO DIFF WBC: CPT | Mod: Q5

## 2019-01-03 PROCEDURE — 96413 CHEMO IV INFUSION 1 HR: CPT | Mod: Q5

## 2019-01-03 PROCEDURE — 36415 COLL VENOUS BLD VENIPUNCTURE: CPT | Mod: Q5

## 2019-01-03 PROCEDURE — 96372 THER/PROPH/DIAG INJ SC/IM: CPT | Mod: 59,Q5

## 2019-01-03 RX ORDER — PANTOPRAZOLE 40 MG/1
40 TABLET, DELAYED RELEASE ORAL DAILY
Qty: 90 | Refills: 1 | Status: ACTIVE | COMMUNITY
Start: 2018-09-21 | End: 1900-01-01

## 2019-01-03 RX ORDER — ONDANSETRON 8 MG/1
8 TABLET ORAL EVERY 8 HOURS
Qty: 30 | Refills: 3 | Status: ACTIVE | COMMUNITY
Start: 2019-01-03 | End: 1900-01-01

## 2019-01-03 RX ORDER — AZITHROMYCIN 500 MG/1
500 TABLET, FILM COATED ORAL DAILY
Qty: 1 | Refills: 1 | Status: DISCONTINUED | COMMUNITY
Start: 2018-12-21 | End: 2019-01-03

## 2019-01-06 DIAGNOSIS — C79.31 SECONDARY MALIGNANT NEOPLASM OF BRAIN: ICD-10-CM

## 2019-01-06 LAB
ALBUMIN SERPL ELPH-MCNC: 3.3 G/DL
ALP BLD-CCNC: 191 U/L
ALT SERPL-CCNC: 54 U/L
ANION GAP SERPL CALC-SCNC: 13 MMOL/L
AST SERPL-CCNC: 86 U/L
BILIRUB SERPL-MCNC: 0.2 MG/DL
BUN SERPL-MCNC: 17 MG/DL
CALCIUM SERPL-MCNC: 8.4 MG/DL
CHLORIDE SERPL-SCNC: 106 MMOL/L
CO2 SERPL-SCNC: 26 MMOL/L
CREAT SERPL-MCNC: 0.44 MG/DL
GLUCOSE SERPL-MCNC: 117 MG/DL
POTASSIUM SERPL-SCNC: 4.1 MMOL/L
PROT SERPL-MCNC: 6 G/DL
SODIUM SERPL-SCNC: 145 MMOL/L

## 2019-01-07 ENCOUNTER — APPOINTMENT (OUTPATIENT)
Dept: HEMATOLOGY ONCOLOGY | Facility: CLINIC | Age: 64
End: 2019-01-07

## 2019-01-07 PROBLEM — C79.31 BRAIN METASTASES: Status: ACTIVE | Noted: 2018-08-26

## 2019-01-07 RX ORDER — DEXAMETHASONE 4 MG/1
4 TABLET ORAL
Refills: 0 | Status: DISCONTINUED | COMMUNITY
Start: 2018-10-12 | End: 2019-01-07

## 2019-01-07 RX ORDER — DEXAMETHASONE 4 MG/1
4 TABLET ORAL
Qty: 60 | Refills: 3 | Status: ACTIVE | COMMUNITY
Start: 2019-01-07 | End: 1900-01-01

## 2019-01-07 RX ORDER — SYRINGE-NEEDLE,INSULIN,0.5 ML 31 GX5/16"
31G X 5/16" SYRINGE, EMPTY DISPOSABLE MISCELLANEOUS
Qty: 1 | Refills: 0 | Status: ACTIVE | COMMUNITY
Start: 2019-01-07 | End: 1900-01-01

## 2019-01-08 ENCOUNTER — APPOINTMENT (OUTPATIENT)
Age: 64
End: 2019-01-08

## 2019-01-13 NOTE — HISTORY OF PRESENT ILLNESS
[Disease: _____________________] : Disease: [unfilled] [M: ___] : M[unfilled] [AJCC Stage: ____] : AJCC Stage: [unfilled] [de-identified] : JACKI HINTON is a 63 y.o. with a minimal smoking hx (1.5 pack years, quit 30 years ago) who we are following for metastatic lung cancer (adeno; brain mets).  \par 8/18/18 -  Presented with seizures. MRI brain - 1.8cm irregular rim enhancing lesion within right posterior occipital lesion with vasogenic edema causing mass effect. \par 8/20/18 - Craniotomy - Path revealed metastatic poorly differentiated non-small cell carcinoma.  Review of slides by Miguelito favors adenocarcinoma.\par CT C/A/P - Indeterminate 10mm RUL nodule, mildly enlarged mediastinal LN's.  2.6cm indeterminate cystic mass at the tail of the pancreas.  F/U MRCP  - limited due to lack of contrast but pancreatic lesion felt to represent a cystic neoplasm or of low malignant potential. (Ca19-9 was WNL). \par 9/2/18 - PET/CT - 0.6cm RUL nodule (SUV 4.6), FDG avid right mediastinal and right hilar LAD.  FDG avid left adrenal nodule and left para-aortic LN, both suspicious for mets.  Pancreatic lesion not FDG avid. \par The case was presented at tumor board.  [place] to be c/w metastatic lung cancer and additional lung biopsy would not add any additional information and would carry risk of procedure related complications. 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  While in hospital she had a fall and fractured T12.  She is now in a brace.  This was felt to be due to osteoporosis and not tumor and she was was started on Fosamax. \par Daughter reports she was started on Insulin.  \par 10/5/18 - 10/9/18 - stereotactic RT to the brain. \par 10/17/18 - Started on palliative chemotherapy with Alimta (we wanted to give Alimta/Carbo, however patient was very reluctant to take chemo and had a transiently low platelet count so carbo is being held for now).\par 11/16/18 - MRI Brain - Slight improvement in postop cavity, but multiple new and enlarging lesions. \par 12/4/18 - 12/19/18 -  WBXRT.\par 12/5/18 - Started on Keytruda.\par Of note the patient only speaks [place].  She is comfortable with her daughter translating for her.    [de-identified] : metastatic poorly differentiated non-small cell carcinoma, favor adenocarcinoma [de-identified] : PD-L1 1%, ALESSIA, TMB 3 (low);  EGFR, KRAS, ALK, BRAF, MET, ERBB2, RET, ROS1 - all wt [de-identified] : 9/14/18 - [facility] - multiple mutations/amplifications, but none actionable  [de-identified] : Completed WBXRT ~ 1 week ago. Patient on Decadron 4mg BID. Has been having a cough (cold like symptoms) for the past few days.  Mucousy, has to cough but has now power behind cough to clear mucous. Dr. Henely said to try Robitussin, not really working.  Daughter states sputum is yellowish-greenish and she does have a new pain in her right upper back/lung.  Constant, but not associated with breathing.  Using Tylenol - states helps. \par Still with baseline headaches - frontal sinus and back of left eye.  The left eye pain is not new, the sinus pain is worse with the cold. \par Chronic nausea on a daily basis. No vomiting.  \par Her rash is better after taking oral fungal medication. She is frustrated with her profound fatigue. Sleeping most of the day. \par She denies any other changes in her family, medical, or social history since her last visit of 12/5/18.

## 2019-01-13 NOTE — REASON FOR VISIT
[Follow-Up Visit] : a follow-up [Other: _____] : [unfilled] [FreeTextEntry2] : Metastatic elliott Cancer - D17 C1 Keytruda

## 2019-01-13 NOTE — RESULTS/DATA
[FreeTextEntry1] : WBC: 8.5    ANC: 6.1  Hgb: 11.1  Hct: 36.5   MCV: 94.1  Plts: 166\par CMP, TSH:pending

## 2019-01-13 NOTE — ASSESSMENT
[FreeTextEntry1] : The patient is a 63 y.o. with a metastatic NSCLC (adeno, brain mets, PD-L1 1%, ALESSIA, pan wt).  Presented with seizures, s/p craniotomy.  Placed on steroids, complicated by steroid induced hyperglycemia and myopathy.  Required hospital admission for glucose levels >400 and was started on insulin.  During this admission she fell and had a T12 fracture - was due to osteoporosis and not related to tumor.  She is now in a back brace and was started on Fosamax.  She then had Cyberknife to the brain. \par 10/17/18 -  Very reluctantly she was started on chemotherapy with Alimta (Plan was for Alimta/Carbo but it was decided to hold Carbo for 1st few cycles due to thrombocytopenia and poor functional status).   After only 2 cycles MRI of the brain showed POD with multiple new areas of disease.  \par s/p WBXRT. \par Started on Keytruda 12/5/18. \par 1. Onc - Will continue on palliative therapy with Keytruda.  \par 2. ?URI - Sounds like patient getting a cold. However since she is on chronic steroids will give a Z-pack.  Daughter aware if fevers or no improvement then to call back and we can consider imaging studies.  Does not look like pneumonitis - OK to stay on Keytruda for now. \par 3. Cough - Will order Hycodan to help patient get some restful sleep. \par 4. Chronic nausea - continue on Zofran. \par 5. Hx DVT - Needs to remain on anticoagulation indefinitely as at high risk for recurrence.  Continue Lovenox.   \par Patient introduced to Dr. Gee (also speaks [place]) who is on call this week. \par \par Dr. Ilda Anaya (PMD)\par Dr. Martin Henley (Rad/Onc)\par Dr. Lenin Fuentes (ENT)\par

## 2019-01-13 NOTE — PHYSICAL EXAM
[Obese] : obese [Normal] : affect appropriate [de-identified] : Cushingoid appearing white female, wearing back brace, sitting in wheel chair [de-identified] : anicteric; head with healed craniotomy incision [de-identified] : no thrush or mucositis; no palpable masses or lymphadenopathy [de-identified] : no lymphadenopathy [de-identified] : S1S2 RRR, no obvious murmurs [de-identified] : no c/c/e

## 2019-01-13 NOTE — REVIEW OF SYSTEMS
[Patient Intake Form Reviewed] : Patient intake form was reviewed [Fatigue] : fatigue [Nosebleeds] : nosebleeds [Shortness Of Breath] : shortness of breath [Cough] : cough [Muscle Weakness] : muscle weakness [Anxiety] : anxiety [Negative] : Allergic/Immunologic [FreeTextEntry2] : pain as in interval history, profound fatigue [FreeTextEntry4] : Still with occasional nose bleeding.  [FreeTextEntry6] : With symptoms of URI [FreeTextEntry7] : Loss of appetite, chronic nausea [FreeTextEntry9] : steroid myopathy [de-identified] : groin rash improved after PO antifungals [de-identified] : some memory loss [de-identified] : sleep problems

## 2020-12-16 PROBLEM — Z87.09 HISTORY OF UPPER RESPIRATORY INFECTION: Status: RESOLVED | Noted: 2018-12-21 | Resolved: 2020-12-16

## 2023-10-01 PROBLEM — K86.89 PANCREATIC MASS: Status: RESOLVED | Noted: 2018-08-29 | Resolved: 2023-10-01

## 2023-10-01 PROBLEM — Z92.3 HISTORY OF RADIATION THERAPY: Status: RESOLVED | Noted: 2018-09-24 | Resolved: 2023-10-01
